# Patient Record
Sex: FEMALE | Race: WHITE | NOT HISPANIC OR LATINO | Employment: UNEMPLOYED | URBAN - METROPOLITAN AREA
[De-identification: names, ages, dates, MRNs, and addresses within clinical notes are randomized per-mention and may not be internally consistent; named-entity substitution may affect disease eponyms.]

---

## 2019-10-21 LAB — RAPID GROUP A STREP (OHS): NEGATIVE

## 2020-01-03 LAB
INFLUENZA A ANTIGEN, POC: NEGATIVE
INFLUENZA B ANTIGEN, POC: NEGATIVE

## 2022-04-11 ENCOUNTER — HISTORICAL (OUTPATIENT)
Dept: ADMINISTRATIVE | Facility: HOSPITAL | Age: 27
End: 2022-04-11

## 2022-04-25 VITALS
DIASTOLIC BLOOD PRESSURE: 82 MMHG | BODY MASS INDEX: 27.13 KG/M2 | HEIGHT: 68 IN | WEIGHT: 179 LBS | SYSTOLIC BLOOD PRESSURE: 120 MMHG | OXYGEN SATURATION: 99 %

## 2022-09-23 ENCOUNTER — HISTORICAL (OUTPATIENT)
Dept: ADMINISTRATIVE | Facility: HOSPITAL | Age: 27
End: 2022-09-23

## 2023-02-23 ENCOUNTER — TELEPHONE (OUTPATIENT)
Dept: OBSTETRICS AND GYNECOLOGY | Facility: CLINIC | Age: 28
End: 2023-02-23
Payer: MEDICAID

## 2023-02-23 NOTE — TELEPHONE ENCOUNTER
Spoke with pt, states has cycle 2 weeks ago then started cycle again on yesterday. Will schedule for apt

## 2023-02-23 NOTE — TELEPHONE ENCOUNTER
----- Message from Dahlia Hayes sent at 2/23/2023  8:19 AM CST -----  Regarding: CYCLE ISSUES  PT NEEDS SOMEONE TO CALL HER BACK. HAS QUESTIONS ABOUT HER CYCLE.    Called pt [phone] with no answer  left

## 2023-02-27 ENCOUNTER — OFFICE VISIT (OUTPATIENT)
Dept: OBSTETRICS AND GYNECOLOGY | Facility: CLINIC | Age: 28
End: 2023-02-27
Payer: MEDICAID

## 2023-02-27 VITALS
HEART RATE: 75 BPM | HEIGHT: 68 IN | BODY MASS INDEX: 34.6 KG/M2 | SYSTOLIC BLOOD PRESSURE: 114 MMHG | OXYGEN SATURATION: 98 % | DIASTOLIC BLOOD PRESSURE: 68 MMHG | WEIGHT: 228.31 LBS

## 2023-02-27 DIAGNOSIS — N92.6 IRREGULAR MENSTRUAL CYCLE: Primary | ICD-10-CM

## 2023-02-27 DIAGNOSIS — R23.2 HOT FLASHES: ICD-10-CM

## 2023-02-27 DIAGNOSIS — R61 NIGHT SWEATS: ICD-10-CM

## 2023-02-27 DIAGNOSIS — Z30.9 ENCOUNTER FOR CONTRACEPTIVE MANAGEMENT, UNSPECIFIED TYPE: ICD-10-CM

## 2023-02-27 LAB
B-HCG UR QL: NEGATIVE
CTP QC/QA: YES

## 2023-02-27 PROCEDURE — 99213 OFFICE O/P EST LOW 20 MIN: CPT | Mod: ,,, | Performed by: NURSE PRACTITIONER

## 2023-02-27 PROCEDURE — 99213 PR OFFICE/OUTPT VISIT, EST, LEVL III, 20-29 MIN: ICD-10-PCS | Mod: ,,, | Performed by: NURSE PRACTITIONER

## 2023-02-27 PROCEDURE — 3008F PR BODY MASS INDEX (BMI) DOCUMENTED: ICD-10-PCS | Mod: CPTII,,, | Performed by: NURSE PRACTITIONER

## 2023-02-27 PROCEDURE — 3078F DIAST BP <80 MM HG: CPT | Mod: CPTII,,, | Performed by: NURSE PRACTITIONER

## 2023-02-27 PROCEDURE — 3008F BODY MASS INDEX DOCD: CPT | Mod: CPTII,,, | Performed by: NURSE PRACTITIONER

## 2023-02-27 PROCEDURE — 81025 URINE PREGNANCY TEST: CPT | Mod: ,,, | Performed by: NURSE PRACTITIONER

## 2023-02-27 PROCEDURE — 81025 POCT URINE PREGNANCY: ICD-10-PCS | Mod: ,,, | Performed by: NURSE PRACTITIONER

## 2023-02-27 PROCEDURE — 1159F MED LIST DOCD IN RCRD: CPT | Mod: CPTII,,, | Performed by: NURSE PRACTITIONER

## 2023-02-27 PROCEDURE — 1160F PR REVIEW ALL MEDS BY PRESCRIBER/CLIN PHARMACIST DOCUMENTED: ICD-10-PCS | Mod: CPTII,,, | Performed by: NURSE PRACTITIONER

## 2023-02-27 PROCEDURE — 1159F PR MEDICATION LIST DOCUMENTED IN MEDICAL RECORD: ICD-10-PCS | Mod: CPTII,,, | Performed by: NURSE PRACTITIONER

## 2023-02-27 PROCEDURE — 3074F SYST BP LT 130 MM HG: CPT | Mod: CPTII,,, | Performed by: NURSE PRACTITIONER

## 2023-02-27 PROCEDURE — 1160F RVW MEDS BY RX/DR IN RCRD: CPT | Mod: CPTII,,, | Performed by: NURSE PRACTITIONER

## 2023-02-27 PROCEDURE — 3074F PR MOST RECENT SYSTOLIC BLOOD PRESSURE < 130 MM HG: ICD-10-PCS | Mod: CPTII,,, | Performed by: NURSE PRACTITIONER

## 2023-02-27 PROCEDURE — 3078F PR MOST RECENT DIASTOLIC BLOOD PRESSURE < 80 MM HG: ICD-10-PCS | Mod: CPTII,,, | Performed by: NURSE PRACTITIONER

## 2023-02-27 RX ORDER — ETONOGESTREL AND ETHINYL ESTRADIOL VAGINAL RING .015; .12 MG/D; MG/D
1 RING VAGINAL
Qty: 1 EACH | Refills: 5 | Status: SHIPPED | OUTPATIENT
Start: 2023-02-27 | End: 2023-05-31 | Stop reason: SDUPTHER

## 2023-02-27 RX ORDER — FERROUS SULFATE 325(65) MG
TABLET ORAL
COMMUNITY
Start: 2023-02-23 | End: 2023-05-31

## 2023-02-27 NOTE — PROGRESS NOTES
Patient ID: [id number]     Chief Complaint: Menstrual Problem, night sweats, hot flashes    HPI:     Krystina Ahmadi is a 27 y.o. female here today for gyn problem.  Patient presents to clinic for an irregular cycle this month. Patient stated that she started a week early, with heavy bleeding/cramping. Then began bleeding a week later which was time she should have been on cycle.  Both cycles lasted 8 days or so. Reports she has never had irreg cycles as this was first time this has occurred. She has had night sweats and hot flashes since delivery of her last child in . She is not breast feeding and has reg cycles, except the one occurrence last month as previously stated.       Past Medical History:  has a past medical history of Anxiety disorder, unspecified and Low iron.    Surgical History:  has a past surgical history that includes Mouth surgery.    Family History: family history includes Anemia in her mother; Breast cancer in her maternal aunt; Diabetes in her father; Hyperlipidemia in her father; Hypertension in her father and mother.      OB History    Para Term  AB Living   1 1 1     1   SAB IAB Ectopic Multiple Live Births           1      # Outcome Date GA Lbr Darell/2nd Weight Sex Delivery Anes PTL Lv   1 Term 21 40w0d  3.43 kg (7 lb 9 oz) F Vag-Spont EPI  JONATAN       Current Outpatient Medications:     etonogestreL-ethinyl estradioL (NUVARING) 0.12-0.015 mg/24 hr vaginal ring, Place 1 each vaginally every 21 days. Insert one (1) ring vaginally and leave in place for three (3) weeks, then remove for one (1) week., Disp: 1 each, Rfl: 5    ferrous sulfate (FEOSOL) 325 mg (65 mg iron) Tab tablet, Take by mouth., Disp: , Rfl:   ROS:   GENERAL: Denies fevers or chills. Feeling well overall.   HEENT: denies h/o migraine.  URINARY: Denies frequency, dysuria, hematuria.  GYNECOLOGIC: denies heavy menses. denies dysmenorrhea.  DERMATOLOGIC: denies acne.     Physical Exam:      PHYSICAL  EXAM:    APPEARANCE: Well nourished, well developed, in no acute distress.  PSYCH: Appropriate mood and affect.  EXTREMITIES: No edema.     Assessment/Plan:     Krystina was seen today for menstrual problem.    Diagnoses and all orders for this visit:    Irregular menstrual cycle  -     POCT Urine Pregnancy    Hot flashes  -     TSH; Future  -     T4, Free; Future  -     Follicle Stimulating Hormone; Future  -     TSH  -     T4, Free  -     Follicle Stimulating Hormone    Night sweats  -     TSH; Future  -     T4, Free; Future  -     Follicle Stimulating Hormone; Future  -     TSH  -     T4, Free  -     Follicle Stimulating Hormone    Encounter for contraceptive management, unspecified type    Other orders  -     etonogestreL-ethinyl estradioL (NUVARING) 0.12-0.015 mg/24 hr vaginal ring; Place 1 each vaginally every 21 days. Insert one (1) ring vaginally and leave in place for three (3) weeks, then remove for one (1) week.      Follow up in about 3 months (around 5/27/2023) for Well Woman Exam, Pill Check.   Inst pt if irreg bleeding occurs or continues to occur to contact office as she will need further work up. Pt requested hormone testing today-therefore fsh was added to order.   Counseling:     The risks of, benefits of, and alternatives of various forms of contraception were discussed at this visit. After a discussion of the R/B/A of fertility awareness, barrier contraception, hormonal pills, injections, patches, rings, IUDs, and the subdermal implant, all of  questions were answered, and she has opted for  nuva ring .

## 2023-02-28 ENCOUNTER — DOCUMENTATION ONLY (OUTPATIENT)
Dept: OBSTETRICS AND GYNECOLOGY | Facility: CLINIC | Age: 28
End: 2023-02-28
Payer: MEDICAID

## 2023-05-31 ENCOUNTER — OFFICE VISIT (OUTPATIENT)
Dept: OBSTETRICS AND GYNECOLOGY | Facility: CLINIC | Age: 28
End: 2023-05-31
Payer: MEDICAID

## 2023-05-31 VITALS
WEIGHT: 223.38 LBS | SYSTOLIC BLOOD PRESSURE: 140 MMHG | HEART RATE: 92 BPM | BODY MASS INDEX: 33.85 KG/M2 | HEIGHT: 68 IN | DIASTOLIC BLOOD PRESSURE: 82 MMHG

## 2023-05-31 DIAGNOSIS — Z30.9 ENCOUNTER FOR CONTRACEPTIVE MANAGEMENT, UNSPECIFIED TYPE: ICD-10-CM

## 2023-05-31 DIAGNOSIS — Z01.419 WOMEN'S ANNUAL ROUTINE GYNECOLOGICAL EXAMINATION: Primary | ICD-10-CM

## 2023-05-31 PROCEDURE — 1159F PR MEDICATION LIST DOCUMENTED IN MEDICAL RECORD: ICD-10-PCS | Mod: CPTII,,, | Performed by: NURSE PRACTITIONER

## 2023-05-31 PROCEDURE — 3077F PR MOST RECENT SYSTOLIC BLOOD PRESSURE >= 140 MM HG: ICD-10-PCS | Mod: CPTII,,, | Performed by: NURSE PRACTITIONER

## 2023-05-31 PROCEDURE — 3079F DIAST BP 80-89 MM HG: CPT | Mod: CPTII,,, | Performed by: NURSE PRACTITIONER

## 2023-05-31 PROCEDURE — 1160F RVW MEDS BY RX/DR IN RCRD: CPT | Mod: CPTII,,, | Performed by: NURSE PRACTITIONER

## 2023-05-31 PROCEDURE — 3008F PR BODY MASS INDEX (BMI) DOCUMENTED: ICD-10-PCS | Mod: CPTII,,, | Performed by: NURSE PRACTITIONER

## 2023-05-31 PROCEDURE — 1159F MED LIST DOCD IN RCRD: CPT | Mod: CPTII,,, | Performed by: NURSE PRACTITIONER

## 2023-05-31 PROCEDURE — 99395 PREV VISIT EST AGE 18-39: CPT | Mod: ,,, | Performed by: NURSE PRACTITIONER

## 2023-05-31 PROCEDURE — 1160F PR REVIEW ALL MEDS BY PRESCRIBER/CLIN PHARMACIST DOCUMENTED: ICD-10-PCS | Mod: CPTII,,, | Performed by: NURSE PRACTITIONER

## 2023-05-31 PROCEDURE — 3077F SYST BP >= 140 MM HG: CPT | Mod: CPTII,,, | Performed by: NURSE PRACTITIONER

## 2023-05-31 PROCEDURE — 99395 PR PREVENTIVE VISIT,EST,18-39: ICD-10-PCS | Mod: ,,, | Performed by: NURSE PRACTITIONER

## 2023-05-31 PROCEDURE — 3008F BODY MASS INDEX DOCD: CPT | Mod: CPTII,,, | Performed by: NURSE PRACTITIONER

## 2023-05-31 PROCEDURE — 3079F PR MOST RECENT DIASTOLIC BLOOD PRESSURE 80-89 MM HG: ICD-10-PCS | Mod: CPTII,,, | Performed by: NURSE PRACTITIONER

## 2023-05-31 RX ORDER — GABAPENTIN 100 MG/1
100 CAPSULE ORAL 2 TIMES DAILY
COMMUNITY
Start: 2023-02-07

## 2023-05-31 RX ORDER — ETONOGESTREL AND ETHINYL ESTRADIOL VAGINAL RING .015; .12 MG/D; MG/D
1 RING VAGINAL
Qty: 1 EACH | Refills: 5 | Status: SHIPPED | OUTPATIENT
Start: 2023-05-31 | End: 2023-05-31 | Stop reason: SDUPTHER

## 2023-05-31 RX ORDER — ETONOGESTREL AND ETHINYL ESTRADIOL VAGINAL RING .015; .12 MG/D; MG/D
1 RING VAGINAL
Qty: 1 EACH | Refills: 5 | Status: SHIPPED | OUTPATIENT
Start: 2023-05-31 | End: 2023-11-13 | Stop reason: SDUPTHER

## 2023-05-31 NOTE — PROGRESS NOTES
Patient ID: [id number]   Chief Complaint: Annual exam  Chief Complaint   Patient presents with    Annual Exam     NO C/O'S. STATES NO PROBLEMS WITH NUVA RING.     HPI:   Krystina Remy is a 27 y.o. year old  here for her Annual Exam. Patient's last menstrual period was 2023 (exact date). She is doing well. Denies any health changes. Annual Exam (NO C/O'S. STATES NO PROBLEMS WITH NUVA RING.)    Subjective:     Past Medical History:   Diagnosis Date    Anxiety disorder, unspecified     Hand pain     Low iron      Past Surgical History:   Procedure Laterality Date    MOUTH SURGERY       Social History     Tobacco Use    Smoking status: Never    Smokeless tobacco: Never   Substance Use Topics    Alcohol use: Yes     Comment: occasionally    Drug use: Never     Family History   Problem Relation Age of Onset    Diabetes Father     Hypertension Father     Hyperlipidemia Father     Hypertension Mother     Anemia Mother     Breast cancer Maternal Aunt      OB History    Para Term  AB Living   1 1 1     1   SAB IAB Ectopic Multiple Live Births           1      # Outcome Date GA Lbr Darell/2nd Weight Sex Delivery Anes PTL Lv   1 Term 21 40w0d  3.43 kg (7 lb 9 oz) F Vag-Spont EPI  JONATAN       Current Outpatient Medications:     gabapentin (NEURONTIN) 100 MG capsule, Take 100 mg by mouth 2 (two) times daily., Disp: , Rfl:     etonogestreL-ethinyl estradioL (NUVARING) 0.12-0.015 mg/24 hr vaginal ring, Place 1 each vaginally every 21 days. Insert one (1) ring vaginally and leave in place for three (3) weeks, then remove for one (1) week., Disp: 1 each, Rfl: 5  MENARCHEAL:  Cycle Length:  5 DAYS  Flow: normal  Dysmenorrhea: YES  If yes: Mild  Intermenstrual Bleeding: No  PAP:  Last PAP: 3-2-22  History of Abnormal PAP Smear: NO  HPV VACCINE-UNKNOWN  INTERCOURSE:  Dyspareunia: No  Postcoital Bleeding: No  History of STI: No  Current Birth Control Method: NuvaRing vaginal inserts  Sexually Active:  "yes  BREAST HISTORY:   Last Mammogram: NONE  COLONOSCOPY:  Last Colonoscopy: NONE          Review of Systems 12 point review of systems conducted, negative except as stated in the history of present illness. See HPI for details.  Objective:   Visit Vitals  BP (!) 140/82   Pulse 92   Ht 5' 8" (1.727 m)   Wt 101.3 kg (223 lb 6.4 oz)   LMP 05/26/2023 (Exact Date)   Breastfeeding No   BMI 33.97 kg/m²     Physical Exam:  Physical Exam  Constitutional:  General Appearance : alert, in no acute distress, normal, well nourished.  Respiratory:  Respiratory Effort: normal.  Breast:  Right: Inspection/palpation: no discharge, no masses present, no nipple retraction, no skin changes, no skin dimpling, no tenderness, no lymphadenopathy, no axillary mass, no axillary tenderness.  Left: Inspection/palpation: no discharge, no masses present, no nipple retraction, no skin changes, no skin dimpling, no tenderness, no lymphadenopathy, no axillary mass, no axillary tenderness.  Gastrointestinal:  Abdomen: no masses. no tender, nondistended.  Liver and spleen: normal  Hernias: no hernias present, no inguinal adenopathy.  Genitourinary:  External Genitalia: normal, no lesions.  Vagina: normal appearance, no abnormal discharge, no lesions.  Bladder: no mass, nontender.  Urethra: no erythema or lesions present.  Cervix: no lesions, non tender. Pap Done  Uterus: nontender, normal contour, normal mobility, normal size.   Adnexa: no masses, no tenderness.  Anus and Perineum: visually normal.   Chaperone Present  No results found for this or any previous visit (from the past 24 hour(s)).  Assessment/Plan:   Assessment:   Women's annual routine gynecological examination  -     Liquid-Based Pap Smear, Screening Screening    Encounter for contraceptive management, unspecified type  -     etonogestreL-ethinyl estradioL (NUVARING) 0.12-0.015 mg/24 hr vaginal ring; Place 1 each vaginally every 21 days. Insert one (1) ring vaginally and leave in place " for three (3) weeks, then remove for one (1) week.  Dispense: 1 each; Refill: 5      Follow up in about 1 year (around 5/31/2024) for ANNUAL. In addition to their scheduled FU, the patient has also been instructed to follow up on as needed basis. All questions were answered and the patient voiced understanding of the above issues.

## 2023-06-06 ENCOUNTER — DOCUMENTATION ONLY (OUTPATIENT)
Dept: OBSTETRICS AND GYNECOLOGY | Facility: CLINIC | Age: 28
End: 2023-06-06
Payer: MEDICAID

## 2023-06-06 LAB
HPV, HIGH-RISK: POSITIVE
PAP SMEAR: ABNORMAL
PSYCHE PATHOLOGY RESULT: NORMAL

## 2023-06-07 ENCOUNTER — TELEPHONE (OUTPATIENT)
Dept: OBSTETRICS AND GYNECOLOGY | Facility: CLINIC | Age: 28
End: 2023-06-07
Payer: MEDICAID

## 2023-06-07 NOTE — TELEPHONE ENCOUNTER
----- Message from Dahlia Hayes sent at 6/7/2023  8:45 AM CDT -----  Regarding: CALL BACK  PT NEEDS SOMEONE TO CALL HER BACK ABOUT HER PAP. DR GIFFORD SAID TO HAVE MRS. BRAVO CALL HER BACK.

## 2023-06-21 ENCOUNTER — PROCEDURE VISIT (OUTPATIENT)
Dept: OBSTETRICS AND GYNECOLOGY | Facility: CLINIC | Age: 28
End: 2023-06-21
Payer: MEDICAID

## 2023-06-21 VITALS
HEART RATE: 89 BPM | HEIGHT: 68 IN | WEIGHT: 223.13 LBS | BODY MASS INDEX: 33.82 KG/M2 | DIASTOLIC BLOOD PRESSURE: 60 MMHG | SYSTOLIC BLOOD PRESSURE: 120 MMHG

## 2023-06-21 DIAGNOSIS — R87.810 ATYPICAL SQUAMOUS CELL CHANGES OF UNDETERMINED SIGNIFICANCE (ASCUS) ON CERVICAL CYTOLOGY WITH POSITIVE HIGH RISK HUMAN PAPILLOMA VIRUS (HPV): ICD-10-CM

## 2023-06-21 DIAGNOSIS — R87.610 ATYPICAL SQUAMOUS CELL CHANGES OF UNDETERMINED SIGNIFICANCE (ASCUS) ON CERVICAL CYTOLOGY WITH POSITIVE HIGH RISK HUMAN PAPILLOMA VIRUS (HPV): ICD-10-CM

## 2023-06-21 DIAGNOSIS — R87.619 ABNORMAL CERVICAL PAPANICOLAOU SMEAR, UNSPECIFIED ABNORMAL PAP FINDING: Primary | ICD-10-CM

## 2023-06-21 LAB
B-HCG UR QL: NEGATIVE
CTP QC/QA: YES

## 2023-06-21 PROCEDURE — 57452 EXAM OF CERVIX W/SCOPE: CPT | Mod: ,,, | Performed by: OBSTETRICS & GYNECOLOGY

## 2023-06-21 PROCEDURE — 81025 URINE PREGNANCY TEST: CPT | Mod: ,,, | Performed by: OBSTETRICS & GYNECOLOGY

## 2023-06-21 PROCEDURE — 57452 COLPOSCOPY: ICD-10-PCS | Mod: ,,, | Performed by: OBSTETRICS & GYNECOLOGY

## 2023-06-21 PROCEDURE — 81025 POCT URINE PREGNANCY: ICD-10-PCS | Mod: ,,, | Performed by: OBSTETRICS & GYNECOLOGY

## 2023-06-21 NOTE — PROCEDURES
Colposcopy    Date/Time: 6/21/2023 1:00 PM  Performed by: Clive Merino MD  Authorized by: Clive Merino MD     Consent Done?:  Yes (Written)  Timeout:Immediately prior to procedure a time out was called to verify the correct patient, procedure, equipment, support staff and site/side marked as required  Prep:Patient was prepped and draped in the usual sterile fashion  Assistants?: Yes    List of assistants:  LACIE JETER MA    Colposcopy Site:  Cervix  Position:  Supine  Acrowhite Lesion: No    Atypical Vessels: No    Transformation Zone Adequate?: Yes    Biopsy?: No    ECC Performed?: No    LEEP Performed?: No     Patient tolerated the procedure well with no immediate complications.     NO LESIONS NOTED, WILL REPEAT PAP IN ONE YEAR.

## 2023-11-13 DIAGNOSIS — Z30.9 ENCOUNTER FOR CONTRACEPTIVE MANAGEMENT, UNSPECIFIED TYPE: ICD-10-CM

## 2023-11-13 RX ORDER — ETONOGESTREL AND ETHINYL ESTRADIOL VAGINAL RING .015; .12 MG/D; MG/D
1 RING VAGINAL
Qty: 1 EACH | Refills: 5 | Status: SHIPPED | OUTPATIENT
Start: 2023-11-13

## 2024-06-09 DIAGNOSIS — Z30.9 ENCOUNTER FOR CONTRACEPTIVE MANAGEMENT, UNSPECIFIED TYPE: ICD-10-CM

## 2024-06-10 RX ORDER — ETONOGESTREL AND ETHINYL ESTRADIOL VAGINAL RING .015; .12 MG/D; MG/D
RING VAGINAL
Qty: 1 EACH | Refills: 5 | Status: SHIPPED | OUTPATIENT
Start: 2024-06-10